# Patient Record
Sex: FEMALE | Race: BLACK OR AFRICAN AMERICAN | NOT HISPANIC OR LATINO | Employment: FULL TIME | URBAN - METROPOLITAN AREA
[De-identification: names, ages, dates, MRNs, and addresses within clinical notes are randomized per-mention and may not be internally consistent; named-entity substitution may affect disease eponyms.]

---

## 2023-11-29 ENCOUNTER — OFFICE VISIT (OUTPATIENT)
Dept: OBSTETRICS AND GYNECOLOGY | Facility: CLINIC | Age: 23
End: 2023-11-29
Payer: COMMERCIAL

## 2023-11-29 VITALS
HEIGHT: 68 IN | SYSTOLIC BLOOD PRESSURE: 104 MMHG | WEIGHT: 194 LBS | DIASTOLIC BLOOD PRESSURE: 66 MMHG | BODY MASS INDEX: 29.4 KG/M2

## 2023-11-29 DIAGNOSIS — Z30.019 ENCOUNTER FOR FEMALE BIRTH CONTROL: Primary | ICD-10-CM

## 2023-11-29 DIAGNOSIS — N91.4 SECONDARY OLIGOMENORRHEA: ICD-10-CM

## 2023-11-29 PROCEDURE — 99214 OFFICE O/P EST MOD 30 MIN: CPT | Performed by: OBSTETRICS & GYNECOLOGY

## 2023-11-29 PROCEDURE — 1036F TOBACCO NON-USER: CPT | Performed by: OBSTETRICS & GYNECOLOGY

## 2023-11-29 RX ORDER — LEVONORGESTREL/ETHIN.ESTRADIOL 0.1-0.02MG
1 TABLET ORAL
COMMUNITY
Start: 2023-04-13

## 2023-11-29 RX ORDER — NORELGESTROMIN AND ETHINYL ESTRADIOL 35; 150 UG/MG; UG/MG
1 PATCH TRANSDERMAL
Qty: 3 PATCH | Refills: 11 | Status: SHIPPED | OUTPATIENT
Start: 2023-11-29 | End: 2024-11-28

## 2023-11-29 ASSESSMENT — ENCOUNTER SYMPTOMS
ALLERGIC/IMMUNOLOGIC NEGATIVE: 1
NEUROLOGICAL NEGATIVE: 1
ENDOCRINE NEGATIVE: 1
PSYCHIATRIC NEGATIVE: 1
CARDIOVASCULAR NEGATIVE: 1
HEMATOLOGIC/LYMPHATIC NEGATIVE: 1
MUSCULOSKELETAL NEGATIVE: 1
RESPIRATORY NEGATIVE: 1
EYES NEGATIVE: 1
GASTROINTESTINAL NEGATIVE: 1
CONSTITUTIONAL NEGATIVE: 1

## 2023-11-29 NOTE — PROGRESS NOTES
Subjective   Patient ID: Katherin Ortiz is a 23 y.o. female who presents for Follow-up (No Menses since EAB on 10/16/23///Chaperone Declined: ANA Frank/).  Had termination October 16.  Has taken the pill before. Doing fine. Happy with this for now. Gained weight on the depo.         Review of Systems   Constitutional: Negative.    HENT: Negative.     Eyes: Negative.    Respiratory: Negative.     Cardiovascular: Negative.    Gastrointestinal: Negative.    Endocrine: Negative.    Genitourinary: Negative.    Musculoskeletal: Negative.    Skin: Negative.    Allergic/Immunologic: Negative.    Neurological: Negative.    Hematological: Negative.    Psychiatric/Behavioral: Negative.         Objective   Physical Exam  Constitutional:       Appearance: Normal appearance. She is normal weight.   Neurological:      General: No focal deficit present.      Mental Status: She is alert.   Psychiatric:         Mood and Affect: Mood normal.         Behavior: Behavior normal.         Thought Content: Thought content normal.         Judgment: Judgment normal.         Assessment/Plan   Problem List Items Addressed This Visit             ICD-10-CM       Genitourinary and Reproductive    Secondary oligomenorrhea N91.4     Discussed that no periods can be normal with the birth control pill.  Discussed other methods, would like to start the patch. Rx. Sent.   Follow up for well woman visit in August.          Other Visit Diagnoses         Codes    Encounter for female birth control    -  Primary Z30.019    Relevant Medications    norelgestromin-ethin.estradioL ([facility]) 150-35 mcg/24 hr    Other Relevant Orders    Follow Up In Gynecology

## 2023-11-29 NOTE — ASSESSMENT & PLAN NOTE
Discussed that no periods can be normal with the birth control pill.  Discussed other methods, would like to start the patch. Rx. Sent.   Follow up for well woman visit in August.

## 2024-12-01 DIAGNOSIS — Z30.019 ENCOUNTER FOR FEMALE BIRTH CONTROL: ICD-10-CM

## 2024-12-02 RX ORDER — NORELGESTROMIN AND ETHINYL ESTRADIOL 35; 150 UG/MG; UG/MG
PATCH TRANSDERMAL
Qty: 9 PATCH | Refills: 0 | Status: SHIPPED | OUTPATIENT
Start: 2024-12-02

## 2025-01-26 ENCOUNTER — OFFICE VISIT (OUTPATIENT)
Dept: URGENT CARE | Age: 25
End: 2025-01-26
Payer: COMMERCIAL

## 2025-01-26 VITALS
DIASTOLIC BLOOD PRESSURE: 65 MMHG | RESPIRATION RATE: 16 BRPM | WEIGHT: 175 LBS | SYSTOLIC BLOOD PRESSURE: 105 MMHG | OXYGEN SATURATION: 98 % | BODY MASS INDEX: 26.61 KG/M2 | HEART RATE: 75 BPM | TEMPERATURE: 97.8 F

## 2025-01-26 DIAGNOSIS — J06.9 VIRAL URI WITH COUGH: Primary | ICD-10-CM

## 2025-01-26 LAB
POC RAPID INFLUENZA A: NEGATIVE
POC RAPID INFLUENZA B: NEGATIVE

## 2025-01-26 PROCEDURE — 87804 INFLUENZA ASSAY W/OPTIC: CPT | Performed by: NURSE PRACTITIONER

## 2025-01-26 PROCEDURE — 99203 OFFICE O/P NEW LOW 30 MIN: CPT | Performed by: NURSE PRACTITIONER

## 2025-01-26 PROCEDURE — 1036F TOBACCO NON-USER: CPT | Performed by: NURSE PRACTITIONER

## 2025-01-26 RX ORDER — BROMPHENIRAMINE MALEATE, PSEUDOEPHEDRINE HYDROCHLORIDE, AND DEXTROMETHORPHAN HYDROBROMIDE 2; 30; 10 MG/5ML; MG/5ML; MG/5ML
10 SYRUP ORAL 4 TIMES DAILY PRN
Qty: 200 ML | Refills: 0 | Status: SHIPPED | OUTPATIENT
Start: 2025-01-26 | End: 2025-01-31

## 2025-01-26 ASSESSMENT — ENCOUNTER SYMPTOMS: HEADACHES: 1

## 2025-01-27 NOTE — PATIENT INSTRUCTIONS
Acute Viral URI:  - Flu negative in UC today  - Good oral hydration; avoid milk products  - Corwin's Vapor rub; humidifier; warm showers  - Take medications as prescribed; otherwise symptomatic treatment with OTC medications  - Advised on s/s to seek emergent care for  - f/u with PCP in the next 3-5 days if no better  - Tylenol/Motrin as needed for body aches or fever

## 2025-01-27 NOTE — PROGRESS NOTES
"Subjective   Patient ID: Katherin Ortiz is a 24 y.o. female. They present today with a chief complaint of Headache, URI, and Nasal Congestion.    History of Present Illness  Pt presents to the  with the c/o flu like symptoms for the alst 2 days. States she was exposed to the flu. C/O cough, headache and nasal congestion. This is not the worst headache of her life. Neuro check normal. No vision changes or other neurological complaints at this time. Pt states \"I want to be tested for the flu.\" No other associated symptoms or concerns to address at this time. No fever, sob, cp or pain with deep inspiration.       Headache  Associated symptoms: URI    URI  Associated symptoms: headaches        Past Medical History  Allergies as of 2025    (No Known Allergies)       (Not in a hospital admission)       Past Medical History:   Diagnosis Date    Encounter for  screening for Streptococcus B 2021     screening for streptococcus B    Encounter for supervision of other normal pregnancy, unspecified trimester ([facility]) 2020    Prenatal care, subsequent pregnancy    Other conditions influencing health status 2020    History of pregnancy    Personal history of other diseases of the female genital tract     History of vaginal discharge    Short Achilles tendon (acquired), unspecified ankle 10/24/2017    Acquired tight Achilles tendon    Sprain of unspecified ligament of left ankle, initial encounter 2017    Left ankle sprain    Vomiting of pregnancy, unspecified 2020    Nausea and vomiting during pregnancy       Past Surgical History:   Procedure Laterality Date    MOUTH SURGERY  2020    Oral Surgery Tooth Extraction        reports that she has never smoked. She has never used smokeless tobacco. She reports that she does not currently use alcohol. She reports that she does not use drugs.    Review of Systems  Review of Systems   Neurological:  Positive for headaches. "   10 point ROS completed and all are negative other than what is stated in the current HPI                                 Objective    Vitals:    01/26/25 1930   BP: 105/65   Pulse: 75   Resp: 16   Temp: 36.6 °C (97.8 °F)   SpO2: 98%   Weight: 79.4 kg (175 lb)     Patient's last menstrual period was 01/17/2025.    Physical Exam  Vitals and nursing note reviewed.   Constitutional:       Appearance: Normal appearance.   HENT:      Head: Normocephalic and atraumatic.      Right Ear: Tympanic membrane, ear canal and external ear normal.      Left Ear: Tympanic membrane, ear canal and external ear normal.      Nose: Congestion present.      Mouth/Throat:      Mouth: Mucous membranes are moist.      Pharynx: No oropharyngeal exudate or posterior oropharyngeal erythema.      Comments: Neg sinus exam  (+) postnasal discharge  Cardiovascular:      Rate and Rhythm: Normal rate and regular rhythm.      Heart sounds: Normal heart sounds.   Pulmonary:      Breath sounds: Normal breath sounds.   Musculoskeletal:      Cervical back: No tenderness.   Lymphadenopathy:      Cervical: No cervical adenopathy.   Skin:     General: Skin is warm and dry.      Findings: No rash.   Neurological:      Mental Status: She is alert and oriented to person, place, and time.   Psychiatric:         Behavior: Behavior normal.         Procedures    Point of Care Test & Imaging Results from this visit  Results for orders placed or performed in visit on 01/26/25   POCT Influenza A/B manually resulted   Result Value Ref Range    POC Rapid Influenza A Negative Negative    POC Rapid Influenza B Negative Negative      No results found.    Diagnostic study results (if any) were reviewed by SHIVANI Cintron.    Assessment/Plan   Allergies, medications, history, and pertinent labs/EKGs/Imaging reviewed by SHIVANI Cintron.     Medical Decision Making  Acute Viral URI:  - Flu negative in UC today  - Good oral hydration; avoid milk  products  - Corwin's Vapor rub; humidifier; warm showers  - Take medications as prescribed; otherwise symptomatic treatment with OTC medications  - Advised on s/s to seek emergent care for  - f/u with PCP in the next 3-5 days if no better  - Tylenol/Motrin as needed for body aches or fever    Orders and Diagnoses  Diagnoses and all orders for this visit:  Viral URI with cough  -     POCT Influenza A/B manually resulted      Medical Admin Record      Patient disposition: Home    Electronically signed by SHIVANI Cintron  7:47 PM